# Patient Record
Sex: FEMALE | Race: BLACK OR AFRICAN AMERICAN | NOT HISPANIC OR LATINO
[De-identification: names, ages, dates, MRNs, and addresses within clinical notes are randomized per-mention and may not be internally consistent; named-entity substitution may affect disease eponyms.]

---

## 2017-05-09 ENCOUNTER — RECORDS - HEALTHEAST (OUTPATIENT)
Dept: ADMINISTRATIVE | Facility: OTHER | Age: 72
End: 2017-05-09

## 2017-05-16 ENCOUNTER — RECORDS - HEALTHEAST (OUTPATIENT)
Dept: ADMINISTRATIVE | Facility: OTHER | Age: 72
End: 2017-05-16

## 2017-05-19 ENCOUNTER — RECORDS - HEALTHEAST (OUTPATIENT)
Dept: ADMINISTRATIVE | Facility: OTHER | Age: 72
End: 2017-05-19

## 2017-06-15 ENCOUNTER — RECORDS - HEALTHEAST (OUTPATIENT)
Dept: ADMINISTRATIVE | Facility: OTHER | Age: 72
End: 2017-06-15

## 2017-06-23 ENCOUNTER — RECORDS - HEALTHEAST (OUTPATIENT)
Dept: ADMINISTRATIVE | Facility: OTHER | Age: 72
End: 2017-06-23

## 2017-06-24 ENCOUNTER — RECORDS - HEALTHEAST (OUTPATIENT)
Dept: ADMINISTRATIVE | Facility: OTHER | Age: 72
End: 2017-06-24

## 2017-07-05 ENCOUNTER — RECORDS - HEALTHEAST (OUTPATIENT)
Dept: ADMINISTRATIVE | Facility: OTHER | Age: 72
End: 2017-07-05

## 2017-07-28 ENCOUNTER — RECORDS - HEALTHEAST (OUTPATIENT)
Dept: ADMINISTRATIVE | Facility: OTHER | Age: 72
End: 2017-07-28

## 2017-08-14 ENCOUNTER — RECORDS - HEALTHEAST (OUTPATIENT)
Dept: ADMINISTRATIVE | Facility: OTHER | Age: 72
End: 2017-08-14

## 2017-08-24 ENCOUNTER — RECORDS - HEALTHEAST (OUTPATIENT)
Dept: ADMINISTRATIVE | Facility: OTHER | Age: 72
End: 2017-08-24

## 2017-11-01 ENCOUNTER — OFFICE VISIT - HEALTHEAST (OUTPATIENT)
Dept: FAMILY MEDICINE | Facility: CLINIC | Age: 72
End: 2017-11-01

## 2017-11-01 DIAGNOSIS — K29.70 GASTRITIS: ICD-10-CM

## 2017-11-01 DIAGNOSIS — G89.29 CHRONIC ABDOMINAL PAIN: ICD-10-CM

## 2017-11-01 DIAGNOSIS — K59.00 CONSTIPATION: ICD-10-CM

## 2017-11-01 DIAGNOSIS — E03.9 HYPOTHYROID: ICD-10-CM

## 2017-11-01 DIAGNOSIS — R10.9 CHRONIC ABDOMINAL PAIN: ICD-10-CM

## 2017-11-01 DIAGNOSIS — N39.41 URGENCY INCONTINENCE: ICD-10-CM

## 2017-11-01 RX ORDER — POLYETHYLENE GLYCOL 3350 17 G/17G
POWDER, FOR SOLUTION ORAL
Qty: 510 G | Refills: 6 | Status: SHIPPED | OUTPATIENT
Start: 2017-11-01

## 2017-11-01 RX ORDER — LEVOTHYROXINE SODIUM 50 UG/1
TABLET ORAL
Refills: 2 | Status: SHIPPED | COMMUNITY
Start: 2017-10-12

## 2017-11-01 ASSESSMENT — MIFFLIN-ST. JEOR: SCORE: 1026.79

## 2017-11-03 ENCOUNTER — COMMUNICATION - HEALTHEAST (OUTPATIENT)
Dept: FAMILY MEDICINE | Facility: CLINIC | Age: 72
End: 2017-11-03

## 2017-11-29 ENCOUNTER — OFFICE VISIT - HEALTHEAST (OUTPATIENT)
Dept: FAMILY MEDICINE | Facility: CLINIC | Age: 72
End: 2017-11-29

## 2017-11-29 ENCOUNTER — COMMUNICATION - HEALTHEAST (OUTPATIENT)
Dept: FAMILY MEDICINE | Facility: CLINIC | Age: 72
End: 2017-11-29

## 2017-11-29 DIAGNOSIS — M25.519 SHOULDER PAIN: ICD-10-CM

## 2017-11-29 DIAGNOSIS — R63.0 POOR APPETITE: ICD-10-CM

## 2017-11-29 DIAGNOSIS — N39.41 URGENCY INCONTINENCE: ICD-10-CM

## 2017-11-29 DIAGNOSIS — K59.00 CONSTIPATION: ICD-10-CM

## 2017-11-29 RX ORDER — NAPROXEN 250 MG/1
250 TABLET ORAL 2 TIMES DAILY WITH MEALS
Qty: 60 TABLET | Refills: 2 | Status: SHIPPED | OUTPATIENT
Start: 2017-11-29

## 2017-12-04 ENCOUNTER — COMMUNICATION - HEALTHEAST (OUTPATIENT)
Dept: FAMILY MEDICINE | Facility: CLINIC | Age: 72
End: 2017-12-04

## 2017-12-04 DIAGNOSIS — R63.0 POOR APPETITE: ICD-10-CM

## 2017-12-05 ENCOUNTER — RECORDS - HEALTHEAST (OUTPATIENT)
Dept: ADMINISTRATIVE | Facility: OTHER | Age: 72
End: 2017-12-05

## 2017-12-10 ENCOUNTER — RECORDS - HEALTHEAST (OUTPATIENT)
Dept: ADMINISTRATIVE | Facility: OTHER | Age: 72
End: 2017-12-10

## 2017-12-11 ENCOUNTER — RECORDS - HEALTHEAST (OUTPATIENT)
Dept: ADMINISTRATIVE | Facility: OTHER | Age: 72
End: 2017-12-11

## 2017-12-18 ENCOUNTER — RECORDS - HEALTHEAST (OUTPATIENT)
Dept: ADMINISTRATIVE | Facility: OTHER | Age: 72
End: 2017-12-18

## 2017-12-19 ENCOUNTER — RECORDS - HEALTHEAST (OUTPATIENT)
Dept: ADMINISTRATIVE | Facility: OTHER | Age: 72
End: 2017-12-19

## 2018-01-12 ENCOUNTER — RECORDS - HEALTHEAST (OUTPATIENT)
Dept: LAB | Facility: CLINIC | Age: 73
End: 2018-01-12

## 2018-01-12 LAB
CHOLEST SERPL-MCNC: 188 MG/DL
FASTING STATUS PATIENT QL REPORTED: NORMAL
HDLC SERPL-MCNC: 64 MG/DL
LDLC SERPL CALC-MCNC: 104 MG/DL
T4 FREE SERPL-MCNC: 0.6 NG/DL (ref 0.7–1.8)
TRIGL SERPL-MCNC: 99 MG/DL
TSH SERPL DL<=0.005 MIU/L-ACNC: 9.84 UIU/ML (ref 0.3–5)

## 2018-02-07 ENCOUNTER — RECORDS - HEALTHEAST (OUTPATIENT)
Dept: LAB | Facility: CLINIC | Age: 73
End: 2018-02-07

## 2018-02-09 LAB — BACTERIA SPEC CULT: NORMAL

## 2018-04-18 ENCOUNTER — RECORDS - HEALTHEAST (OUTPATIENT)
Dept: LAB | Facility: CLINIC | Age: 73
End: 2018-04-18

## 2018-04-18 LAB
ALBUMIN SERPL-MCNC: 3.4 G/DL (ref 3.5–5)
ALP SERPL-CCNC: 83 U/L (ref 45–120)
ALT SERPL W P-5'-P-CCNC: 10 U/L (ref 0–45)
ANION GAP SERPL CALCULATED.3IONS-SCNC: 10 MMOL/L (ref 5–18)
AST SERPL W P-5'-P-CCNC: 20 U/L (ref 0–40)
BILIRUB SERPL-MCNC: 0.5 MG/DL (ref 0–1)
BUN SERPL-MCNC: 12 MG/DL (ref 8–28)
CALCIUM SERPL-MCNC: 9.4 MG/DL (ref 8.5–10.5)
CHLORIDE BLD-SCNC: 105 MMOL/L (ref 98–107)
CO2 SERPL-SCNC: 24 MMOL/L (ref 22–31)
CREAT SERPL-MCNC: 0.7 MG/DL (ref 0.6–1.1)
GFR SERPL CREATININE-BSD FRML MDRD: >60 ML/MIN/1.73M2
GLUCOSE BLD-MCNC: 78 MG/DL (ref 70–125)
POTASSIUM BLD-SCNC: 4.4 MMOL/L (ref 3.5–5)
PROT SERPL-MCNC: 7.3 G/DL (ref 6–8)
SODIUM SERPL-SCNC: 139 MMOL/L (ref 136–145)
T4 FREE SERPL-MCNC: 0.6 NG/DL (ref 0.7–1.8)
TSH SERPL DL<=0.005 MIU/L-ACNC: 8.36 UIU/ML (ref 0.3–5)

## 2018-04-19 LAB — 25(OH)D3 SERPL-MCNC: 36.3 NG/ML (ref 30–80)

## 2018-04-24 ENCOUNTER — RECORDS - HEALTHEAST (OUTPATIENT)
Dept: ADMINISTRATIVE | Facility: OTHER | Age: 73
End: 2018-04-24

## 2018-05-07 ENCOUNTER — AMBULATORY - HEALTHEAST (OUTPATIENT)
Dept: NEUROLOGY | Facility: CLINIC | Age: 73
End: 2018-05-07

## 2018-05-07 DIAGNOSIS — G31.84 MCI (MILD COGNITIVE IMPAIRMENT): ICD-10-CM

## 2018-07-02 ENCOUNTER — HOSPITAL ENCOUNTER (OUTPATIENT)
Dept: NEUROLOGY | Facility: CLINIC | Age: 73
Setting detail: THERAPIES SERIES
Discharge: STILL ADMITTED OR ADMINISTRATIVE | End: 2018-07-02
Attending: SOCIAL WORKER

## 2021-03-22 ENCOUNTER — COMMUNICATION - HEALTHEAST (OUTPATIENT)
Dept: FAMILY MEDICINE | Facility: CLINIC | Age: 76
End: 2021-03-22

## 2021-05-31 VITALS — HEIGHT: 65 IN | WEIGHT: 121 LBS | BODY MASS INDEX: 20.16 KG/M2

## 2021-05-31 VITALS — BODY MASS INDEX: 20.62 KG/M2 | WEIGHT: 122 LBS

## 2021-06-13 NOTE — PROGRESS NOTES
"Subjective: This patient comes in the clinic for the first time.  She is here to establish care.    She was at [facility] back on 10/27/2017    Patient had chest x-ray which shows some bilateral interstitial infiltrate which felt most likely fibrosis less likely pneumonia.  She went on for CT scan which confirmed that she has some old granulomatous changes.    She had an ultrasound of the right upper quadrant gallbladder was negative.  She has had some decreased appetite.  At times she has some gastritis.    She does have some omeprazole.    Patient has a history of hypothyroid she is on levothyroxine 50 mcg daily.    We discussed her bowels and symptoms were to try some MiraLAX.  Also referral for colonoscopy.    Patient's also had evidence of some urinary incontinence and I gave her referral for [facility] regarding this.    Patient did have multiple complaints about multiple organ systems.  She says she has had problems for \"20 years \".    Labs from [facility] showed the urine negative CMP normal glucose 108 hemoglobin 11.8 white count 5800    Past history she came to [place] in 2016 from [place] lived in [place] for 12 years prior to coming [place].    We will try to get records from [facility].  She saw ED Clement, at that clinic.    Otherwise no additional concerns or issues at this time.  We will plan to recheck things in a month        Family history noncontributory no cancers that the patient knows of.        Tobacco status: She  reports that she has never smoked. She has never used smokeless tobacco.    There are no active problems to display for this patient.      Current Outpatient Prescriptions   Medication Sig Dispense Refill     levothyroxine (SYNTHROID, LEVOTHROID) 50 MCG tablet TK 1 T PO QD  2     naproxen (NAPROSYN) 250 MG tablet        omeprazole (PRILOSEC) 20 MG capsule TK 1 C PO ONCE D  1     polyethylene glycol (GLYCOLAX) 17 gram/dose powder 17 gm in 8 oz water daily " "510 g 6     No current facility-administered medications for this visit.        ROS: 10 point review of systems positive as outlined otherwise negative    Objective:    BP 90/54 (Patient Site: Left Arm, Patient Position: Sitting, Cuff Size: Adult Regular)  Pulse 88  Resp 16  Ht 5' 4.5\" (1.638 m)  Wt 121 lb (54.9 kg)  BMI 20.45 kg/m2  Body mass index is 20.45 kg/(m^2).      General appearance no acute distress talkative somewhat animated.    Vital signs stable pressure little on the low side but pulse normal.  I do not have previous weights.    HEENT neck without adenopathy oropharynx is clear pupils react normally extraocular movements are full    Her lungs were clear no rales no rhonchi heart was regular S1-S2 no murmur rate in the upper 80s    Abdomen was soft bowel sounds normal she complains of some pain \"all over but no point tenderness no guarding no rebound no mass appreciated    Eyes without scleral icterus    Extremities without edema skin was normal from what was seen.    Joints without significant redness warmth or swelling she gets some body aches she states.    Neck without thyroid fullness.    Gait was normal no focal weakness.    Review of chest x-ray right upper quadrant ultrasound CT scan from [facility] also review of lab tests done please see above.    Labs from today showed normal free T4 and therapeutic TSH    Results for orders placed or performed in visit on 11/01/17   T4, Free   Result Value Ref Range    Free T4 1.0 0.7 - 1.8 ng/dL   Thyroid Stimulating Hormone (TSH)   Result Value Ref Range    TSH 1.28 0.30 - 5.00 uIU/mL       Assessment:  1. Chronic abdominal pain  Ambulatory referral for Colonoscopy   2. Urgency incontinence  Ambulatory referral to Urology   3. Constipation  polyethylene glycol (GLYCOLAX) 17 gram/dose powder   4. Hypothyroid  T4, Free    Thyroid Stimulating Hormone (TSH)   5. Gastritis       Chronic abdominal pain question constipation use MiraLAX also will go " on for colonoscopy    Urinary incontinence based on symptoms of urge incontinence mainly will have her see [facility]    Constipation discussion try to increase fruits vegetables fiber and also use MiraLAX daily I gave her a prescription    Hypothyroid therapeutic dose    Gastritis continue omeprazole    Plan: As outlined plan to recheck in a month, get old records    This transcription uses voice recognition software, which may contain typographical errors.

## 2021-06-14 NOTE — PROGRESS NOTES
Subjective: This patient comes in for follow-up.  She was seen for the first time back on 11/1/2017.  Prior to that she was at [facility] on 10/27/2017.  She had chest x-ray and CT scan which showed some granulomatous changes.    Patient also had an ultrasound of the right upper quadrant which was negative.    She has been on levothyroxine.    Labs were drawn on 11/1/2017 which showed the therapeutic T4 TSH.  She will stay on the same dose    She was having some problems with constipation and I treated her with polyethylene glycol.    She has been on full scoop full 17 g in 8 ounces of water daily and now has had some looser stools.    I discussed taking a half of dose and can take that every day or every other day and titrate as needed.    Patient's also had some urinary issues and had urinary urgency incontinence and will see urology.    Continues on omeprazole.    We now some older records from [facility] as well as recent CT scan the summer of the head for headaches.    She has had a poor appetite and has been on some Ensure in the past which is helpful I did refill that today.        Tobacco status: She  reports that she has never smoked. She has never used smokeless tobacco.    There are no active problems to display for this patient.      Current Outpatient Prescriptions   Medication Sig Dispense Refill     levothyroxine (SYNTHROID, LEVOTHROID) 50 MCG tablet TK 1 T PO QD  2     naproxen (NAPROSYN) 250 MG tablet Take 1 tablet (250 mg total) by mouth 2 (two) times a day with meals. 60 tablet 2     omeprazole (PRILOSEC) 20 MG capsule TK 1 C PO ONCE D  1     polyethylene glycol (GLYCOLAX) 17 gram/dose powder 17 gm in 8 oz water daily 510 g 6     ENSURE   11     No current facility-administered medications for this visit.        ROS: Review of systems negative other than as outlined above    Objective:    /60 (Patient Site: Left Arm, Patient Position: Sitting, Cuff Size: Adult Regular)  Pulse 68  Resp 16   Wt 122 lb (55.3 kg)  BMI 20.62 kg/m2  Body mass index is 20.62 kg/(m^2).      General appearance no acute distress    HEENT neck was supple nontender    Oropharynx is clear    Lungs are clear no rales rhonchi    Abdomen is soft bowel sounds normal.  No rash.    Lab work is outlined    Shoulder with some pain with rotational movements.  Will treat with naproxen take with food.    Results for orders placed or performed in visit on 11/01/17   T4, Free   Result Value Ref Range    Free T4 1.0 0.7 - 1.8 ng/dL   Thyroid Stimulating Hormone (TSH)   Result Value Ref Range    TSH 1.28 0.30 - 5.00 uIU/mL       Assessment:  1. Urgency incontinence     2. Constipation     3. Shoulder pain  naproxen (NAPROSYN) 250 MG tablet     Upcoming evaluation with [facility]    Constipation, sounds like she is getting too much of the MiraLAX will back off because of some looser stools    4.  Hypothyroid, therapeutic dose continue levothyroxine    Range of motion exercises and naproxen for the shoulder    Plan: As outlined above    This transcription uses voice recognition software, which may contain typographical errors.

## 2021-06-16 NOTE — TELEPHONE ENCOUNTER
LMTCB #1 to see if patient was interested in getting the COVID-19 Vaccine at [facility] on 3/27 if she hasn't already.    If she got it, please notate this information down and re-route it to the provider pool. Thanks.    Name of vaccine:  Place of vaccination:  Date of 1st dose:  Date of 2nd dose:  Lot #: (patient may or may not have this)

## 2021-06-19 NOTE — PROGRESS NOTES
Patient no-showed for appointment today. Closing.    Harvinder Doherty, Social Work Intern  7/2/18 2954    I have read, discussed, and agree with the documentation as presented by Harvinder Doherty, Social Work Intern.    [facility]